# Patient Record
Sex: MALE
[De-identification: names, ages, dates, MRNs, and addresses within clinical notes are randomized per-mention and may not be internally consistent; named-entity substitution may affect disease eponyms.]

---

## 2022-12-01 ENCOUNTER — TRANSCRIBE ORDERS (OUTPATIENT)
Dept: OTHER | Age: 27
End: 2022-12-01

## 2022-12-01 DIAGNOSIS — J39.2 THROAT IRRITATION: Primary | ICD-10-CM

## 2022-12-01 NOTE — TELEPHONE ENCOUNTER
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  6/9/22, 5/20/22- note from Maddie Ochoa APRN, CNP    1/6/22- note from Tiburcio Flores M.D.   More notes in care everywhere until 11/24/17 Care everywhere  12/19/22- Pending  -PACS                             December 19, 2022 3:35 PM - Faxed a request to [facility] to push Image to [facility] P[facility]- [facility]  January 9, 2023 10:26 AM - Faxed a 2nd request to [facility] to push Image to [facility] PACS- [facility]  January 16, 2023 12:49 PM Called [place] to push images to [facility] PACS- [place]     January 16, 2023 1:40 PM - Received image in pacs and attached it to patient- [facility]

## 2023-01-24 NOTE — PROGRESS NOTES
Otolaryngology Clinic      Name: Quincy Tong  MRN: [id number]  Age: 27 year old  : 1995  Referring provider: Rick L Nissen  2023      Chief Complaint:  Consultation    History of Present Illness:   Quincy Tong is a 27 year old male with a history of GERD who presents for consultation regarding left sided throat pain and irritation. He has had ongoing symptoms which wax and wane for about 2 years. His pain is worse on the left side which can extend from his tonsils up to his right ear. When the pain radiates to his ear it feels like more of a numbness versus pain. At times when his pain is exacerbated he can reach back and itch the back of his throat which seems to help. The irritation increases with swallowing foods but can swallow liquids without difficulty. He has noticed in the summer while talking on the phone for about an hour causes voice soreness which is somewhat improved when he talks on the phone less in the winter. He likes to sing at [employer] on . He has also noticed a geographic tongue on the left side as well as weakness. He denies bleeding or redness of his tongue. He has noticed small lumps on the left side of his neck. He did try physical therapy which seemed help somewhat but he does not have time to do this consistently. He has also tried treating GERD and anxiety as possible sources of his symptoms with no improvement. He has not been taking any pain medications due to his history of GERD. He obtained laboratory studies to evaluate for possible causes as well but these were within normal limits. He has followed with ENT at [place] in the past with reportedly negative flex endoscopies. He more recently consulted with Dr. Nissen and there was concern for elongated styloid so he was referred here for further discussion of next steps. He denies history of tonsillectomy.      Active Medications:   Lexapro   Prilosec      Allergies:   Patient has no known allergies.      Past  "Medical History:  GERD  ADHD with hyperactivity disorder   Rectal bleeding      Past Surgical History:  [place] teeth extraction     Family History:   Arrhythmia    Sleep apnea  Brain tumor      Social History:        Review of Systems:   Pertinent items are noted in HPI or as in patient entered ROS below, remainder of complete ROS is negative.    ENT ROS 1/31/2023   Constitutional: Weight gain   Neurology: Dizzy spells, Numbness   Psychology: Frequently feeling anxious   Ears, Nose, Throat: Ear pain, Nasal congestion or drainage, Sore throat, Trouble swallowing   Cardiopulmonary: Cough   Gastrointestinal/Genitourinary: Heartburn/indigestion, Constipation, Diarrhea   Musculoskeletal: Back pain, Neck pain   Hematologic: Bleeding problems   Endocrine: Heat or cold intolerance         Physical Exam:   /70 (BP Location: Left arm, Patient Position: Sitting, Cuff Size: Adult Large)   Pulse 67   Ht 1.778 m (5' 10\")   Wt 87.5 kg (193 lb)   SpO2 100%   BMI 27.69 kg/m       Constitutional:  The patient was accompanied, well-groomed, and in no acute distress.    Skin:  Warm and pink.    Neurologic:  Alert and oriented x 3.  CN's III-XII within normal limits.  Voice normal.   Psychiatric:  The patient's affect was calm, cooperative, and appropriate.    Respiratory:  Breathing comfortably without stridor or exertion of accessory muscles.    Eyes: Extraocular movement intact.    Head:  Normocephalic and atraumatic.  No lesions or scars.    Nose:  Sinuses were non-tender.  Anterior rhinoscopy revealed midline septum and absence of purulence or polyps.    OC/OP:  Normal tongue, floor of mouth, buccal mucosa, and palate.  No lesions or masses on inspection or palpation.  No abnormal lymph tissue in the oropharynx.  The pterygoid region is non-tender.  No trigger points of tenderness.   Neck:  Supple with normal laryngeal and tracheal landmarks.  The parotid beds were without masses.  No palpable thyroid.  Lymphatic:  " There is no palpable lymphadenopathy in the neck.     Imaging:   CT neck soft tissue 4/13/22  IMPRESSION:   1. No soft tissue mass or lymphadenopathy. The patient's palpable area of concern corresponds to the mid left sternocleidomastoid muscle.   2. Symmetric narrowing of the oropharyngeal airway at the level of the uvula. Correlation with physical exam recommended.   3. Mild/moderate right frontal, ethmoid and sphenoid sinus disease.    US head neck soft tissue 2/15/22  IMPRESSION:   Normal exam.    Assessment and Plan:    ICD-10-CM    1. Throat irritation  J39.2 Adult ENT  Referral         Quincy Tong is a 27 year old male with a history of GERD who presents for consultation regarding left sided throat pain and irritation. I discussed potential etiology of Eagle's syndrome and will communicate with radiology in order to have the styloid process measured to further evaluate further. We had a discussion about Eagle syndrome and discussed surgical intervention including risks for stroke and persistence of symptoms. His symptoms certainly do fit a characteristic of Sitka's syndrome but we will evaluate his anatomy further in order to rule this out and will discuss other next steps pending this. I discussed options for injections if his anatomy is not consistent with Sitka's syndrome. I discussed option for salicylate and other antiinflammatory medications for 3 weeks at a time for pain. I recommend he start using Voltaren cream or asper cream for pain in the mean time and we will check in pending measurements from radiology.     Follow-up: No follow-ups on file.         Scribe Disclosure:  I, Paige Henry, am serving as a scribe to document services personally performed by Joe aBins MD at this visit, based upon the provider's statements to me. All documentation has been reviewed by the aforementioned provider prior to being entered into the official medical record.

## 2023-01-31 ENCOUNTER — OFFICE VISIT (OUTPATIENT)
Dept: OTOLARYNGOLOGY | Facility: CLINIC | Age: 28
End: 2023-01-31
Attending: OTOLARYNGOLOGY
Payer: COMMERCIAL

## 2023-01-31 ENCOUNTER — PRE VISIT (OUTPATIENT)
Dept: OTOLARYNGOLOGY | Facility: CLINIC | Age: 28
End: 2023-01-31

## 2023-01-31 VITALS
BODY MASS INDEX: 27.63 KG/M2 | HEART RATE: 67 BPM | SYSTOLIC BLOOD PRESSURE: 114 MMHG | DIASTOLIC BLOOD PRESSURE: 70 MMHG | OXYGEN SATURATION: 100 % | HEIGHT: 70 IN | WEIGHT: 193 LBS

## 2023-01-31 DIAGNOSIS — J39.2 THROAT IRRITATION: ICD-10-CM

## 2023-01-31 PROCEDURE — 99204 OFFICE O/P NEW MOD 45 MIN: CPT | Performed by: OTOLARYNGOLOGY

## 2023-01-31 ASSESSMENT — PAIN SCALES - GENERAL: PAINLEVEL: MILD PAIN (3)

## 2023-01-31 NOTE — LETTER
2023       RE: Quincy Tong  [address] [facility] [address]     Dear Colleague,    Thank you for referring your patient, Quincy Tong, to the [facility] EAR NOSE AND THROAT CLINIC [place] at [facility]. Please see a copy of my visit note below.      Otolaryngology Clinic      Name: Quincy Tong  MRN: [id number]  Age: 27 year old  : 1995  Referring provider: Rick L Nissen  2023      Chief Complaint:  Consultation    History of Present Illness:   Quincy Tong is a 27 year old male with a history of GERD who presents for consultation regarding left sided throat pain and irritation. He has had ongoing symptoms which wax and wane for about 2 years. His pain is worse on the left side which can extend from his tonsils up to his right ear. When the pain radiates to his ear it feels like more of a numbness versus pain. At times when his pain is exacerbated he can reach back and itch the back of his throat which seems to help. The irritation increases with swallowing foods but can swallow liquids without difficulty. He has noticed in the summer while talking on the phone for about an hour causes voice soreness which is somewhat improved when he talks on the phone less in the winter. He likes to sing at [employer] [place] . He has also noticed a geographic tongue on the left side as well as weakness. He denies bleeding or redness of his tongue. He has noticed small lumps on the left side of his neck. He did try physical therapy which seemed help somewhat but he does not have time to do this consistently. He has also tried treating GERD and anxiety as possible sources of his symptoms with no improvement. He has not been taking any pain medications due to his history of GERD. He obtained laboratory studies to evaluate for possible causes as well but these were within normal limits. He has followed with ENT at [place] in the past with  "reportedly negative flex endoscopies. He more recently consulted with Dr. Nissen and there was concern for elongated styloid so he was referred here for further discussion of next steps. He denies history of tonsillectomy.      Active Medications:   Lexapro   Prilosec      Allergies:   Patient has no known allergies.      Past Medical History:  GERD  ADHD with hyperactivity disorder   Rectal bleeding      Past Surgical History:  [place] teeth extraction     Family History:   Arrhythmia    Sleep apnea  Brain tumor      Social History:        Review of Systems:   Pertinent items are noted in HPI or as in patient entered ROS below, remainder of complete ROS is negative.    ENT ROS 1/31/2023   Constitutional: Weight gain   Neurology: Dizzy spells, Numbness   Psychology: Frequently feeling anxious   Ears, Nose, Throat: Ear pain, Nasal congestion or drainage, Sore throat, Trouble swallowing   Cardiopulmonary: Cough   Gastrointestinal/Genitourinary: Heartburn/indigestion, Constipation, Diarrhea   Musculoskeletal: Back pain, Neck pain   Hematologic: Bleeding problems   Endocrine: Heat or cold intolerance         Physical Exam:   /70 (BP Location: Left arm, Patient Position: Sitting, Cuff Size: Adult Large)   Pulse 67   Ht 1.778 m (5' 10\")   Wt 87.5 kg (193 lb)   SpO2 100%   BMI 27.69 kg/m       Constitutional:  The patient was accompanied, well-groomed, and in no acute distress.    Skin:  Warm and pink.    Neurologic:  Alert and oriented x 3.  CN's III-XII within normal limits.  Voice normal.   Psychiatric:  The patient's affect was calm, cooperative, and appropriate.    Respiratory:  Breathing comfortably without stridor or exertion of accessory muscles.    Eyes: Extraocular movement intact.    Head:  Normocephalic and atraumatic.  No lesions or scars.    Nose:  Sinuses were non-tender.  Anterior rhinoscopy revealed midline septum and absence of purulence or polyps.    OC/OP:  Normal tongue, floor of mouth, " buccal mucosa, and palate.  No lesions or masses on inspection or palpation.  No abnormal lymph tissue in the oropharynx.  The pterygoid region is non-tender.  No trigger points of tenderness.   Neck:  Supple with normal laryngeal and tracheal landmarks.  The parotid beds were without masses.  No palpable thyroid.  Lymphatic:  There is no palpable lymphadenopathy in the neck.     Imaging:   CT neck soft tissue 4/13/22  IMPRESSION:   1. No soft tissue mass or lymphadenopathy. The patient's palpable area of concern corresponds to the mid left sternocleidomastoid muscle.   2. Symmetric narrowing of the oropharyngeal airway at the level of the uvula. Correlation with physical exam recommended.   3. Mild/moderate right frontal, ethmoid and sphenoid sinus disease.    US head neck soft tissue 2/15/22  IMPRESSION:   Normal exam.    Assessment and Plan:    ICD-10-CM    1. Throat irritation  J39.2 Adult ENT  Referral         Quincy Tong is a 27 year old male with a history of GERD who presents for consultation regarding left sided throat pain and irritation. I discussed potential etiology of Eagle's syndrome and will communicate with radiology in order to have the styloid process measured to further evaluate further. We had a discussion about Eagle syndrome and discussed surgical intervention including risks for stroke and persistence of symptoms. His symptoms certainly do fit a characteristic of [place]'s syndrome but we will evaluate his anatomy further in order to rule this out and will discuss other next steps pending this. I discussed options for injections if his anatomy is not consistent with Bennington's syndrome. I discussed option for salicylate and other antiinflammatory medications for 3 weeks at a time for pain. I recommend he start using Voltaren cream or asper cream for pain in the mean time and we will check in pending measurements from radiology.     Follow-up: No follow-ups on file.         Scribe  Disclosure:  I, Paige Henry, am serving as a scribe to document services personally performed by Joe Bains MD at this visit, based upon the provider's statements to me. All documentation has been reviewed by the aforementioned provider prior to being entered into the official medical record.       Again, thank you for allowing me to participate in the care of your patient.      Sincerely,    Joe Bains MD

## 2023-01-31 NOTE — PATIENT INSTRUCTIONS
"You were seen in the clinic today by Dr. Bains. If you have any questions or concerns after your appointment, please call the clinic at [phone]. Press \"1\" for scheduling, press \"3\" for nurse advice.    2.   The following has been recommended for you based upon your appointment today:   - We will follow up with you via phone to discuss our findings once radiology has completed taking measurements of your imaging.   -Apply Voltaren cream or Aspercreme to the affected area to help treat your pain.  These creams can be purchased over-the-counter at [facility] pharmacy.    Yecenia BROWN, RN  [facility]  Department of Otolaryngology  [phone]      "

## 2023-02-05 ENCOUNTER — MYC MEDICAL ADVICE (OUTPATIENT)
Dept: OTOLARYNGOLOGY | Facility: CLINIC | Age: 28
End: 2023-02-05
Payer: COMMERCIAL

## 2023-02-05 DIAGNOSIS — J39.2 THROAT IRRITATION: Primary | ICD-10-CM

## 2023-02-09 RX ORDER — SALSALATE 500 MG/1
500 TABLET, FILM COATED ORAL 2 TIMES DAILY
Qty: 28 TABLET | Refills: 1 | Status: SHIPPED | OUTPATIENT
Start: 2023-02-09 | End: 2024-09-04

## 2023-02-12 ENCOUNTER — HEALTH MAINTENANCE LETTER (OUTPATIENT)
Age: 28
End: 2023-02-12

## 2023-02-14 ENCOUNTER — VIRTUAL VISIT (OUTPATIENT)
Dept: OTOLARYNGOLOGY | Facility: CLINIC | Age: 28
End: 2023-02-14
Payer: COMMERCIAL

## 2023-02-14 VITALS — HEIGHT: 70 IN | WEIGHT: 190 LBS | BODY MASS INDEX: 27.2 KG/M2

## 2023-02-14 DIAGNOSIS — M79.2 NEUROPATHIC PAIN: Primary | ICD-10-CM

## 2023-02-14 PROCEDURE — 99213 OFFICE O/P EST LOW 20 MIN: CPT | Mod: 95 | Performed by: OTOLARYNGOLOGY

## 2023-02-14 ASSESSMENT — PAIN SCALES - GENERAL: PAINLEVEL: MILD PAIN (2)

## 2023-02-14 NOTE — PROGRESS NOTES
We had a phone visit with Mr. Quincy Tong today.  He [place] in [place], [place] of the [facility].  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  We got a craniofacial CT on him and he does not have elongated styloid processes, calcified ligaments, etc. I have been evaluating him for quite some time. He was not offered tonsillectomy or anything else of this nature.  He has been to a number of otolaryngologists.  His pain is quite severe for him.  He would like to get to a root cause for this if possible.  I talked to him today about the fact that it is not Freedom syndrome, but that with this pain that he gets, it is not necessarily increasing symptoms, but has been moderately severe for him.  I am going to go ahead and get a skull base MRI on this to make sure he is not having any type of tumor along the glossopharyngeal nerve or any inflammation in the glossopharyngeal nerve.  At that point in time, he may gain some benefit from swallowing or throat rehabilitation-type clinic through the [facility].  It may be that this is going to be relegated to a complex craniofacial pain problem that would have to be seen by Dr. Carrasco or his colleagues as well.      10-15 minutes

## 2023-02-14 NOTE — LETTER
Date:March 8, 2023      Patient was self referred, no letter generated. Do not send.        [facility] Health Information

## 2023-02-14 NOTE — PATIENT INSTRUCTIONS
"You were seen in the clinic today by Dr. Bains. If you have any questions or concerns after your appointment, please call the clinic at [phone]. Press \"1\" for scheduling, press \"3\" for nurse advice.    2.   The following has been recommended for you based upon your appointment today:   -MRI skull base    3.   Plan to return the clinic in with a phone visit after MRI is completed    Yecenia BROWN, RN  [facility]  Department of Otolaryngology  [phone]     "

## 2023-02-14 NOTE — LETTER
2/14/2023       RE: Quincy Tong  [address] [facility] [address]     Dear Colleague,    Thank you for referring your patient, Quincy Tong, to the [facility] EAR NOSE AND THROAT CLINIC [place] at [facility]. Please see a copy of my visit note below.    We had a phone visit with Mr. Quincy Tong today.  He lives in [place], [place] of the [facility].  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  We got a craniofacial CT on him and he does not have elongated styloid processes, calcified ligaments, etc. I have been evaluating him for quite some time. He was not offered tonsillectomy or anything else of this nature.  He has been to a number of otolaryngologists.  His pain is quite severe for him.  He would like to get to a root cause for this if possible.  I talked to him today about the fact that it is not [place] syndrome, but that with this pain that he gets, it is not necessarily increasing symptoms, but has been moderately severe for him.  I am going to go ahead and get a skull base MRI on this to make sure he is not having any type of tumor along the glossopharyngeal nerve or any inflammation in the glossopharyngeal nerve.  At that point in time, he may gain some benefit from swallowing or throat rehabilitation-type clinic through the [facility].  It may be that this is going to be relegated to a complex craniofacial pain problem that would have to be seen by Dr. Carrasco or his colleagues as well.      10-15 minutes        Again, thank you for allowing me to participate in the care of your patient.      Sincerely,    Joe Bains MD

## 2023-02-16 ENCOUNTER — TELEPHONE (OUTPATIENT)
Dept: OTOLARYNGOLOGY | Facility: CLINIC | Age: 28
End: 2023-02-16
Payer: COMMERCIAL

## 2023-02-24 ENCOUNTER — MYC MEDICAL ADVICE (OUTPATIENT)
Dept: OTOLARYNGOLOGY | Facility: CLINIC | Age: 28
End: 2023-02-24
Payer: COMMERCIAL

## 2023-02-24 DIAGNOSIS — M79.2 NEUROPATHIC PAIN: Primary | ICD-10-CM

## 2023-02-24 NOTE — TELEPHONE ENCOUNTER
This author spoke with Yecenia Vazquez RN regarding the patient's previous [employer] message request for a neck MRI in addition to the MRI of the patient's skull base.  It was decided that an order would be issued for an MRI soft tissue neck in addition to the skull base MRI.  Author sent a [employer] message to the patient to offer the potential MR opening that would require that the patient come in at 6:30 that morning.  Patient responded that he would like to take that opportunity, and the author arranged the new MRI appointment with the imaging scheduling team and sent a confirmation message to the patient via [employer].    Gloria Lemus LPN

## 2024-01-02 ENCOUNTER — OFFICE VISIT (OUTPATIENT)
Dept: OTOLARYNGOLOGY | Facility: CLINIC | Age: 29
End: 2024-01-02
Payer: COMMERCIAL

## 2024-01-02 VITALS
SYSTOLIC BLOOD PRESSURE: 117 MMHG | HEART RATE: 78 BPM | BODY MASS INDEX: 27.04 KG/M2 | OXYGEN SATURATION: 100 % | WEIGHT: 188.9 LBS | DIASTOLIC BLOOD PRESSURE: 73 MMHG | HEIGHT: 70 IN

## 2024-01-02 DIAGNOSIS — R21 RASH: Primary | ICD-10-CM

## 2024-01-02 PROCEDURE — 99213 OFFICE O/P EST LOW 20 MIN: CPT | Performed by: OTOLARYNGOLOGY

## 2024-01-02 RX ORDER — ERYTHROMYCIN 20 MG/G
GEL TOPICAL DAILY
Qty: 30 G | Refills: 0 | Status: SHIPPED | OUTPATIENT
Start: 2024-01-02 | End: 2024-09-04

## 2024-01-02 RX ORDER — FLUTICASONE PROPIONATE 50 MCG
1 SPRAY, SUSPENSION (ML) NASAL DAILY
COMMUNITY
End: 2024-09-04

## 2024-01-02 RX ORDER — NYSTATIN 100000 U/G
OINTMENT TOPICAL 2 TIMES DAILY
Qty: 15 G | Refills: 1 | Status: SHIPPED | OUTPATIENT
Start: 2024-01-02 | End: 2024-09-04

## 2024-01-02 ASSESSMENT — PAIN SCALES - GENERAL: PAINLEVEL: MILD PAIN (3)

## 2024-01-02 NOTE — PROGRESS NOTES
"  Otolaryngology Clinic    Name: Quincy Tong  MRN: [id number]  Age: 28 year old  : 1995  2024      Chief Complaint:   Follow up     History of Present Illness:   Quincy Tong is a 28 year old male with a history of GERD who presents for follow up. Today he complaints of increased throat pain/swelling which has been waxing and waning for the past 2 years. His pain is worse on the left side which extend from his tonsils to the roof of his mouth. He has also tried treating GERD and anxiety as possible sources of his symptoms with no improvement. He recently tried methylene blue but has stopped taking the medication due to not feeling well.    He works as a .      Review of Systems:   Pertinent items are noted in HPI or as in patient entered ROS below, remainder of complete ROS is negative.       2023     7:12 AM    ENT ROS   Constitutional Weight gain   Neurology Dizzy spells    Numbness   Psychology Frequently feeling anxious   Ears, Nose, Throat Ear pain    Nasal congestion or drainage    Sore throat    Trouble swallowing   Cardiopulmonary Cough   Gastrointestinal/Genitourinary Heartburn/indigestion    Constipation    Diarrhea   Musculoskeletal Back pain    Neck pain   Hematologic Bleeding problems   Endocrine Heat or cold intolerance        Physical Exam:   /73   Pulse 78   Ht 1.778 m (5' 10\")   Wt 85.7 kg (188 lb 14.4 oz)   SpO2 100%   BMI 27.10 kg/m       PHYSICAL EXAMINATION:    Constitutional:  The patient was accompanied, well-groomed, and in no acute distress.    Skin:  Warm and pink.    Neurologic:  Alert and oriented x 3.  CN's III-XII within normal limits.  Voice normal.   Psychiatric:  The patient's affect was calm, cooperative, and appropriate.    Respiratory:  Breathing comfortably without stridor or exertion of accessory muscles.    Eyes: Extraocular movement intact.    Head:  Normocephalic and atraumatic.  No lesions or scars.    Nose:  " Sinuses were non-tender.  Anterior rhinoscopy revealed midline septum and absence of purulence or polyps.    OC/OP:  Normal tongue, floor of mouth, buccal mucosa, and palate.  No lesions or masses on inspection or palpation.  No abnormal lymph tissue in the oropharynx.  The pterygoid region is non-tender.  No trigger points of tenderness.   Neck:  Supple with normal laryngeal and tracheal landmarks.  The parotid beds were without masses.  No palpable thyroid.  Lymphatic:  There is no palpable lymphadenopathy in the neck.      Assessment and Plan:  Quincy Tong is a 28 year old male with a history of GERD who presents for follow up. He is still having local  throat pain which he will use topical gentian violet. an ointment for. He may hae an unusual fungal infection in his tonsillar area. I will follow up with him via phone in 5 weeks.       Follow-up: Return in about 5 weeks (around 2/6/2024) for using a video visit, using a phone visit.         Scribe Disclosure:   I, Sravanthi Mayfield, am serving as a scribe; to document services personally performed by Joe Bains MD -based on data collection and the provider's statements to me.     Provider Disclosure:  I agree with above History, Review of Systems, Physical exam and Plan.  I have reviewed the content of the documentation and have edited it as needed. I have personally performed the services documented here and the documentation accurately represents those services and the decisions I have made.      Electronically signed by:  Joe Bains MD

## 2024-01-02 NOTE — LETTER
"2024       RE: Quincy Tong  [address] [address]     Dear Colleague,    Thank you for referring your patient, Quincy Tong, to the [facility] EAR NOSE AND THROAT CLINIC [place] at [facility]. Please see a copy of my visit note below.      Otolaryngology Clinic    Name: Quincy Tong  MRN: [id number]  Age: 28 year old  : 1995  2024      Chief Complaint:   Follow up     History of Present Illness:   Quincy Tong is a 28 year old male with a history of GERD who presents for follow up. Today he complaints of increased throat pain/swelling which has been waxing and waning for the past 2 years. His pain is worse on the left side which extend from his tonsils to the roof of his mouth. He has also tried treating GERD and anxiety as possible sources of his symptoms with no improvement. He recently tried methylene blue but has stopped taking the medication due to not feeling well.    He works as a .      Review of Systems:   Pertinent items are noted in HPI or as in patient entered ROS below, remainder of complete ROS is negative.       2023     7:12 AM   [facility] ENT ROS   Constitutional Weight gain   Neurology Dizzy spells    Numbness   Psychology Frequently feeling anxious   Ears, Nose, Throat Ear pain    Nasal congestion or drainage    Sore throat    Trouble swallowing   Cardiopulmonary Cough   Gastrointestinal/Genitourinary Heartburn/indigestion    Constipation    Diarrhea   Musculoskeletal Back pain    Neck pain   Hematologic Bleeding problems   Endocrine Heat or cold intolerance        Physical Exam:   /73   Pulse 78   Ht 1.778 m (5' 10\")   Wt 85.7 kg (188 lb 14.4 oz)   SpO2 100%   BMI 27.10 kg/m       PHYSICAL EXAMINATION:    Constitutional:  The patient was accompanied, well-groomed, and in no acute distress.    Skin:  Warm and pink.    Neurologic:  Alert and oriented x 3.  CN's " III-XII within normal limits.  Voice normal.   Psychiatric:  The patient's affect was calm, cooperative, and appropriate.    Respiratory:  Breathing comfortably without stridor or exertion of accessory muscles.    Eyes: Extraocular movement intact.    Head:  Normocephalic and atraumatic.  No lesions or scars.    Nose:  Sinuses were non-tender.  Anterior rhinoscopy revealed midline septum and absence of purulence or polyps.    OC/OP:  Normal tongue, floor of mouth, buccal mucosa, and palate.  No lesions or masses on inspection or palpation.  No abnormal lymph tissue in the oropharynx.  The pterygoid region is non-tender.  No trigger points of tenderness.   Neck:  Supple with normal laryngeal and tracheal landmarks.  The parotid beds were without masses.  No palpable thyroid.  Lymphatic:  There is no palpable lymphadenopathy in the neck.      Assessment and Plan:  Quincy Tong is a 28 year old male with a history of GERD who presents for follow up. He is still having local  throat pain which he will use topical gentian violet. an ointment for. He may hae an unusual fungal infection in his tonsillar area. I will follow up with him via phone in 5 weeks.       Follow-up: Return in about 5 weeks (around 2/6/2024) for using a video visit, using a phone visit.         Scribe Disclosure:   I, Sravanthi Mayfield, am serving as a scribe; to document services personally performed by Joe Bains MD -based on data collection and the provider's statements to me.     Provider Disclosure:  I agree with above History, Review of Systems, Physical exam and Plan.  I have reviewed the content of the documentation and have edited it as needed. I have personally performed the services documented here and the documentation accurately represents those services and the decisions I have made.      Electronically signed by:  Joe Bains MD            Again, thank you for allowing me to participate in the care of your patient.       Sincerely,    Joe Bains MD

## 2024-01-02 NOTE — PATIENT INSTRUCTIONS
Patient may have an unusual fungal infection like histoplasmosis in his tonsillar area. 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 Hopefully within 24 hours. He  lives in [place]./     This should be sent in 2 specimens. One for fungal stain and one for pathology/     A staff or a PA can do this.

## 2024-02-06 ENCOUNTER — APPOINTMENT (OUTPATIENT)
Dept: OTOLARYNGOLOGY | Facility: CLINIC | Age: 29
End: 2024-02-06
Payer: COMMERCIAL

## 2024-03-10 ENCOUNTER — HEALTH MAINTENANCE LETTER (OUTPATIENT)
Age: 29
End: 2024-03-10

## 2024-04-08 ENCOUNTER — TELEPHONE (OUTPATIENT)
Dept: OTOLARYNGOLOGY | Facility: CLINIC | Age: 29
End: 2024-04-08
Payer: COMMERCIAL

## 2024-04-08 NOTE — TELEPHONE ENCOUNTER
[facility] Call Center    Phone Message    May a detailed message be left on voicemail: yes     Reason for Call: Other: Per pt he is stating that he is having a flare up and needs to be seen today. Please call to discuss. Thank you     Action Taken: Message routed to:  [facility] ([facility] ENT    Travel Screening: Not Applicable

## 2024-04-09 NOTE — TELEPHONE ENCOUNTER
"Returned call to patient who indicates that his symptoms of \"red spots\" on his tonsil as well as some pain have resolved. He feels like he noticed these symptoms for a few days and no longer indicates any concern.     Patient offered phone visit to follow-up from previous appointment with Dr. Bains. Patient scheduled on 4/16 at 11:45am.     He was encouraged to call with further questions or concerns.       Irma Trivedi RN, BSN    "

## 2024-04-16 ENCOUNTER — TELEPHONE (OUTPATIENT)
Dept: OTOLARYNGOLOGY | Facility: CLINIC | Age: 29
End: 2024-04-16

## 2024-04-16 ENCOUNTER — VIRTUAL VISIT (OUTPATIENT)
Dept: OTOLARYNGOLOGY | Facility: CLINIC | Age: 29
End: 2024-04-16
Payer: COMMERCIAL

## 2024-04-16 DIAGNOSIS — M79.2 NEUROPATHIC PAIN: Primary | ICD-10-CM

## 2024-04-16 PROCEDURE — 99212 OFFICE O/P EST SF 10 MIN: CPT | Mod: 93 | Performed by: OTOLARYNGOLOGY

## 2024-04-16 NOTE — PROGRESS NOTES
We spoke with the patient today and he seems to have some kind of fissuring of his tongue that might be new.  He states that he will get a white sort of small lesion that may also be on his tongue and an area.  He says that he will continue to have this area of pain along his left anterior tonsillar pillar as well.  Of course this was determined to a phone call visit today and I think at the present time today we will need to get him in for a visit and physical visit to some sort of biopsy of his tonsil with either a fine-needle aspiration cytology or with a cup forceps of some kind.  We are on the phone with him today from 12:00 until 12:05 PM.  With extra 5 minutes of chart preparation time.

## 2024-04-16 NOTE — LETTER
4/16/2024       RE: Quincy Tong  [address] [address]     Dear Colleague,    Thank you for referring your patient, Quincy Tong, to the [facility] EAR NOSE AND THROAT CLINIC [place] at [facility]. Please see a copy of my visit note below.    We spoke with the patient today and he seems to have some kind of fissuring of his tongue that might be new.  He states that he will get a white sort of small lesion that may also be on his tongue and an area.  He says that he will continue to have this area of pain along his left anterior tonsillar pillar as well.  Of course this was determined to a phone call visit today and I think at the present time today we will need to get him in for a visit and physical visit to some sort of biopsy of his tonsil with either a fine-needle aspiration cytology or with a cup forceps of some kind.  We are on the phone with him today from 12:00 until 12:05 PM.  With extra 5 minutes of chart preparation time.      Again, thank you for allowing me to participate in the care of your patient.      Sincerely,    Joe Bains MD

## 2024-05-07 ENCOUNTER — OFFICE VISIT (OUTPATIENT)
Dept: OTOLARYNGOLOGY | Facility: CLINIC | Age: 29
End: 2024-05-07
Payer: COMMERCIAL

## 2024-05-07 VITALS — BODY MASS INDEX: 26.63 KG/M2 | HEIGHT: 70 IN | WEIGHT: 186 LBS

## 2024-05-07 DIAGNOSIS — J35.9 LESION OF TONSIL: Primary | ICD-10-CM

## 2024-05-07 PROCEDURE — 99213 OFFICE O/P EST LOW 20 MIN: CPT | Mod: 25 | Performed by: OTOLARYNGOLOGY

## 2024-05-07 PROCEDURE — 42800 BIOPSY OF THROAT: CPT | Performed by: OTOLARYNGOLOGY

## 2024-05-07 PROCEDURE — 88305 TISSUE EXAM BY PATHOLOGIST: CPT | Mod: TC | Performed by: OTOLARYNGOLOGY

## 2024-05-07 PROCEDURE — 88305 TISSUE EXAM BY PATHOLOGIST: CPT | Mod: 26 | Performed by: STUDENT IN AN ORGANIZED HEALTH CARE EDUCATION/TRAINING PROGRAM

## 2024-05-07 ASSESSMENT — PAIN SCALES - GENERAL: PAINLEVEL: SEVERE PAIN (6)

## 2024-05-07 NOTE — PATIENT INSTRUCTIONS
"You were seen in the clinic today by Dr. Bains. If you have any questions or concerns after your appointment, please call the clinic at [phone]. Press \"1\" for scheduling, press \"3\" for nurse advice.    2.   The following has been recommended for you based upon your appointment today:   -we will call with results from tonsil biopsy       Yecenia BROWN, RN  [facility]  Department of Otolaryngology  [phone]     "

## 2024-05-07 NOTE — LETTER
"2024       RE: Quincy Tong  [address] [address]     Dear Colleague,    Thank you for referring your patient, Quincy Tong, to the [facility] EAR NOSE AND THROAT CLINIC [place] at [facility]. Please see a copy of my visit note below.      Otolaryngology Clinic    Name: Quincy Tong  MRN: [id number]  Age: 28 year old  : 1995  2024      Chief Complaint:   Follow up     History of Present Illness:   Quincy Tong is a 28 year old male with a history of GERD who presents for follow up. He has been dealing with increased throat pain/swelling which has been waxing and waning for the past 2 years. Today he reports having little white spots in his tonsils. He feels that his tonsils may be infected. Has stopped taking methylene blue as the medication did not make him feel well.     He works as a .     Review of Systems:   Pertinent items are noted in HPI or as in patient entered ROS below, remainder of complete ROS is negative.       2024     9:58 AM    ENT ROS   Neurology Dizzy spells   Ears, Nose, Throat Ear pain   Cardiopulmonary Cough   Endocrine Heat or cold intolerance        Physical Exam:   Ht 1.778 m (5' 10\")   Wt 84.4 kg (186 lb)   BMI 26.69 kg/m       PHYSICAL EXAMINATION:    Constitutional:  The patient was accompanied, well-groomed, and in no acute distress.    Skin:  Warm and pink.    Neurologic:  Alert and oriented x 3.  CN's III-XII within normal limits.  Voice normal.   Psychiatric:  The patient's affect was calm, cooperative, and appropriate.    Respiratory:  Breathing comfortably without stridor or exertion of accessory muscles.    Eyes: Extraocular movement intact.    Head:  Normocephalic and atraumatic.  No lesions or scars.    Nose:  Sinuses were non-tender.  Anterior rhinoscopy revealed midline septum and absence of purulence or polyps.    OC/OP:  Normal tongue, floor of " mouth, buccal mucosa, and palate.  No lesions or masses on inspection or palpation. No abnormal lymph tissue in the oropharynx.  The pterygoid region is non-tender.  No trigger points of tenderness.   Neck:  Supple with normal laryngeal and tracheal landmarks.  The parotid beds were without masses.  No palpable thyroid.  Lymphatic:  There is no palpable lymphadenopathy in the neck.      Assessment and Plan:  No diagnosis found.  Quincy Tong is a 28 year old male with a history of GERD who presents for follow up. He states some little white spots in his tonsils. In clinic today, we tried to biopsy all of those areas.     Follow-up: No follow-ups on file.        Scribe Disclosure:   I, Sravanthi Mayfield, am serving as a scribe; to document services personally performed by Joe Bains MD -based on data collection and the provider's statements to me.     Provider Disclosure:  I agree with above History, Review of Systems, Physical exam and Plan.  I have reviewed the content of the documentation and have edited it as needed. I have personally performed the services documented here and the documentation accurately represents those services and the decisions I have made.      Electronically signed by:          Again, thank you for allowing me to participate in the care of your patient.      Sincerely,    Joe Bains MD

## 2024-05-07 NOTE — PROGRESS NOTES
"  Otolaryngology Clinic    Name: Qiuncy Tong  MRN: [id number]  Age: 28 year old  : 1995  2024      Chief Complaint:   Follow up     History of Present Illness:   Quincy Tong is a 28 year old male with a history of GERD who presents for follow up. He has been dealing with increased throat pain/swelling which has been waxing and waning for the past 2 years. Today he reports having little white spots in his tonsils. He feels that his tonsils may be infected. Has stopped taking methylene blue as the medication did not make him feel well.     He works as a .     Review of Systems:   Pertinent items are noted in HPI or as in patient entered ROS below, remainder of complete ROS is negative.       2024     9:58 AM    ENT ROS   Neurology Dizzy spells   Ears, Nose, Throat Ear pain   Cardiopulmonary Cough   Endocrine Heat or cold intolerance        Physical Exam:   Ht 1.778 m (5' 10\")   Wt 84.4 kg (186 lb)   BMI 26.69 kg/m       PHYSICAL EXAMINATION:    Constitutional:  The patient was accompanied, well-groomed, and in no acute distress.    Skin:  Warm and pink.    Neurologic:  Alert and oriented x 3.  CN's III-XII within normal limits.  Voice normal.   Psychiatric:  The patient's affect was calm, cooperative, and appropriate.    Respiratory:  Breathing comfortably without stridor or exertion of accessory muscles.    Eyes: Extraocular movement intact.    Head:  Normocephalic and atraumatic.  No lesions or scars.    Nose:  Sinuses were non-tender.  Anterior rhinoscopy revealed midline septum and absence of purulence or polyps.    OC/OP:  Normal tongue, floor of mouth, buccal mucosa, and palate.  No lesions or masses on inspection or palpation. No abnormal lymph tissue in the oropharynx.  The pterygoid region is non-tender.  No trigger points of tenderness.   Neck:  Supple with normal laryngeal and tracheal landmarks.  The parotid beds were without masses.  No palpable " thyroid.  Lymphatic:  There is no palpable lymphadenopathy in the neck.      Assessment and Plan:  No diagnosis found.  Quincy Tong is a 28 year old male with a history of GERD who presents for follow up. He states some little white spots in his tonsils. In clinic today, we tried to biopsy all of those areas.     Follow-up: No follow-ups on file.        Scribe Disclosure:   I, Sravanthi Mayfield, am serving as a scribe; to document services personally performed by Joe Bains MD -based on data collection and the provider's statements to me.     Provider Disclosure:  I agree with above History, Review of Systems, Physical exam and Plan.  I have reviewed the content of the documentation and have edited it as needed. I have personally performed the services documented here and the documentation accurately represents those services and the decisions I have made.      Electronically signed by:

## 2024-05-08 LAB
PATH REPORT.COMMENTS IMP SPEC: NORMAL
PATH REPORT.COMMENTS IMP SPEC: NORMAL
PATH REPORT.FINAL DX SPEC: NORMAL
PATH REPORT.GROSS SPEC: NORMAL
PATH REPORT.MICROSCOPIC SPEC OTHER STN: NORMAL
PATH REPORT.RELEVANT HX SPEC: NORMAL
PHOTO IMAGE: NORMAL

## 2024-05-15 ENCOUNTER — MYC MEDICAL ADVICE (OUTPATIENT)
Dept: OTOLARYNGOLOGY | Facility: CLINIC | Age: 29
End: 2024-05-15
Payer: COMMERCIAL

## 2024-05-15 DIAGNOSIS — J35.9 LESION OF TONSIL: Primary | ICD-10-CM

## 2024-05-15 RX ORDER — PENICILLIN V POTASSIUM 500 MG/1
500 TABLET, FILM COATED ORAL 2 TIMES DAILY
Qty: 20 TABLET | Refills: 0 | Status: SHIPPED | OUTPATIENT
Start: 2024-05-15 | End: 2024-05-28

## 2024-05-28 ENCOUNTER — MYC MEDICAL ADVICE (OUTPATIENT)
Dept: OTOLARYNGOLOGY | Facility: CLINIC | Age: 29
End: 2024-05-28
Payer: COMMERCIAL

## 2024-05-28 DIAGNOSIS — J35.9 LESION OF TONSIL: ICD-10-CM

## 2024-05-28 RX ORDER — PENICILLIN V POTASSIUM 500 MG/1
500 TABLET, FILM COATED ORAL 2 TIMES DAILY
Qty: 20 TABLET | Refills: 0 | Status: SHIPPED | OUTPATIENT
Start: 2024-05-28 | End: 2024-09-04

## 2024-06-12 ENCOUNTER — MYC MEDICAL ADVICE (OUTPATIENT)
Dept: OTOLARYNGOLOGY | Facility: CLINIC | Age: 29
End: 2024-06-12
Payer: COMMERCIAL

## 2024-06-12 DIAGNOSIS — J35.9 LESION OF TONSIL: Primary | ICD-10-CM

## 2024-06-20 RX ORDER — DOXYCYCLINE 100 MG/1
100 CAPSULE ORAL 2 TIMES DAILY
Qty: 28 CAPSULE | Refills: 0 | Status: SHIPPED | OUTPATIENT
Start: 2024-06-20 | End: 2024-09-04

## 2024-07-06 ENCOUNTER — MYC MEDICAL ADVICE (OUTPATIENT)
Dept: OTOLARYNGOLOGY | Facility: CLINIC | Age: 29
End: 2024-07-06
Payer: COMMERCIAL

## 2024-07-08 NOTE — TELEPHONE ENCOUNTER
This writer spoke with patient regarding scheduling follow up with Dr. Bains. Patient notes that he is doing better but things aren't completely gone. Patient stresses difficulty of appointments given his job. Advised patient that it would be okay to reschedule the appointment to later. Patient asked for a Monday first thing appointment of a Friday afternoon appointment. Patient was advised that unfortunately Dr. Bains is only in clinic on Tuesdays. Offered patient an early morning patient with Dr. Bains. Patient rescheduled for August 6th at 7:15 am. Patient advised to reach back out for any questions or concerns prior to that appointment or if the appointment time doesn't end up working with his job.    ALLEN COLES, OLGA LIDIA on 7/8/2024 at 12:20 PM

## 2024-08-06 ENCOUNTER — OFFICE VISIT (OUTPATIENT)
Dept: OTOLARYNGOLOGY | Facility: CLINIC | Age: 29
End: 2024-08-06
Payer: COMMERCIAL

## 2024-08-06 VITALS
DIASTOLIC BLOOD PRESSURE: 83 MMHG | SYSTOLIC BLOOD PRESSURE: 120 MMHG | HEART RATE: 82 BPM | WEIGHT: 191.9 LBS | BODY MASS INDEX: 27.47 KG/M2 | OXYGEN SATURATION: 99 % | HEIGHT: 70 IN

## 2024-08-06 DIAGNOSIS — A42.9 ACTINOMYCOSIS: Primary | ICD-10-CM

## 2024-08-06 PROCEDURE — 99213 OFFICE O/P EST LOW 20 MIN: CPT | Performed by: OTOLARYNGOLOGY

## 2024-08-06 ASSESSMENT — PAIN SCALES - GENERAL: PAINLEVEL: NO PAIN (1)

## 2024-08-06 NOTE — PROGRESS NOTES
The patient is here for follow-up today.  The chronic throat pain that he has been having is attributable to both exacerbation of reflux or something similar in the evenings with soda as well as when he has reflux symptoms that are not remediated by Prevacid or something similar to this otherwise he seems to be doing reasonably well he was recommended to have a GI appointment this was not given we did do a biopsy of his throat for some lesions that may have been there and these were actually colonies of actinomycosis.    On physical examination today the patient is alert and oriented drain present skin face of the neck and him is otherwise normal no adenopathy in his neck is HIS oropharynx is clear's ears are examined they are clear throughout.  Assessment    Patient with a history of some chronic pharyngitis is probably attributable to both GI reflux as well as intermittent problems perhaps with overgrowth of actinomycosis we have sent a new ID consult with a diagnosis factor mycosis and tissues as well as a GI consult hopefully to [facility] for him to see someone closer to home and we did see him again perhaps in about 6 months or so by phone to make sure his own appointments have occurred I do not think that doing a tonsillectomy anything else of this nature is going to automatically take care of all of his problems.

## 2024-08-06 NOTE — LETTER
8/6/2024       RE: Quincy Tong  [address] [facility] [address]     Dear Colleague,    Thank you for referring your patient, Quincy Tong, to the [facility] EAR NOSE AND THROAT CLINIC [place] at [facility]. Please see a copy of my visit note below.    The patient is here for follow-up today.  The chronic throat pain that he has been having is attributable to both exacerbation of reflux or something similar in the evenings with soda as well as when he has reflux symptoms that are not remediated by Prevacid or something similar to this otherwise he seems to be doing reasonably well he was recommended to have a GI appointment this was not given we did do a biopsy of his throat for some lesions that may have been there and these were actually colonies of actinomycosis.    On physical examination today the patient is alert and oriented drain present skin face of the neck and him is otherwise normal no adenopathy in his neck is HIS oropharynx is clear's ears are examined they are clear throughout.  Assessment    Patient with a history of some chronic pharyngitis is probably attributable to both GI reflux as well as intermittent problems perhaps with overgrowth of actinomycosis we have sent a new ID consult with a diagnosis factor mycosis and tissues as well as a GI consult hopefully to [facility] for him to see someone closer to home and we did see him again perhaps in about 6 months or so by phone to make sure his own appointments have occurred I do not think that doing a tonsillectomy anything else of this nature is going to automatically take care of all of his problems.      Again, thank you for allowing me to participate in the care of your patient.      Sincerely,    Joe Bains MD

## 2024-08-06 NOTE — PATIENT INSTRUCTIONS
You were seen in the ENT Clinic today by Dr. Bains. If you have any questions or concerns after your appointment, please contact us (see below)       2.   The following recommendations have been made based upon your appointment today:   -Referrals have been placed. Let us know if you have any issues getting appointments scheduled.      3.   Plan to return to the ENT clinic in 6 months.           How to Contact Us:  Send a [employer] message to your provider. Our team will respond to you via [employer]. Occasionally, we will need to call you to get further information.  For urgent matters (Monday-Friday), call the ENT Clinic: [phone] and speak with a call center team member - they will route your call appropriately.   If you'd like to speak directly with a nurse, please find our contact information below. We do our best to check voicemail frequently throughout the day, and will work to call you back within 1-2 days. For urgent matters, please use the general clinic phone numbers listed above.     LINDY Keene  Direct: [phone]  Maddie MOORE LPN  Direct: [phone]         [facility]  Department of Otolaryngology

## 2024-08-14 NOTE — CONFIDENTIAL NOTE
DIAGNOSIS: Actinomycosis    DATE RECEIVED:  09.04.2024    NOTES (Gather within 2 years) STATUS DETAILS   OFFICE NOTE from referring provider   Internal 08.06.2024 Joe Bains MD    LABS (any labs) Internal    MEDICATION LIST Internal

## 2024-09-04 ENCOUNTER — LAB (OUTPATIENT)
Dept: LAB | Facility: CLINIC | Age: 29
End: 2024-09-04
Payer: COMMERCIAL

## 2024-09-04 ENCOUNTER — TELEPHONE (OUTPATIENT)
Dept: INFECTIOUS DISEASES | Facility: CLINIC | Age: 29
End: 2024-09-04

## 2024-09-04 ENCOUNTER — PRE VISIT (OUTPATIENT)
Dept: INFECTIOUS DISEASES | Facility: CLINIC | Age: 29
End: 2024-09-04
Payer: COMMERCIAL

## 2024-09-04 ENCOUNTER — OFFICE VISIT (OUTPATIENT)
Dept: INFECTIOUS DISEASES | Facility: CLINIC | Age: 29
End: 2024-09-04
Attending: OTOLARYNGOLOGY
Payer: COMMERCIAL

## 2024-09-04 VITALS
SYSTOLIC BLOOD PRESSURE: 126 MMHG | WEIGHT: 196 LBS | OXYGEN SATURATION: 98 % | HEART RATE: 89 BPM | HEIGHT: 70 IN | BODY MASS INDEX: 28.06 KG/M2 | DIASTOLIC BLOOD PRESSURE: 86 MMHG

## 2024-09-04 DIAGNOSIS — A42.9 ACTINOMYCOSIS: ICD-10-CM

## 2024-09-04 LAB
ALBUMIN SERPL BCG-MCNC: 4.4 G/DL (ref 3.5–5.2)
ALP SERPL-CCNC: 115 U/L (ref 40–150)
ALT SERPL W P-5'-P-CCNC: 34 U/L (ref 0–70)
ANION GAP SERPL CALCULATED.3IONS-SCNC: 10 MMOL/L (ref 7–15)
AST SERPL W P-5'-P-CCNC: 24 U/L (ref 0–45)
BASOPHILS # BLD AUTO: 0 10E3/UL (ref 0–0.2)
BASOPHILS NFR BLD AUTO: 1 %
BILIRUB SERPL-MCNC: 0.3 MG/DL
BUN SERPL-MCNC: 16.8 MG/DL (ref 6–20)
CALCIUM SERPL-MCNC: 9.6 MG/DL (ref 8.8–10.4)
CHLORIDE SERPL-SCNC: 108 MMOL/L (ref 98–107)
CREAT SERPL-MCNC: 1 MG/DL (ref 0.67–1.17)
EGFRCR SERPLBLD CKD-EPI 2021: >90 ML/MIN/1.73M2
EOSINOPHIL # BLD AUTO: 0.1 10E3/UL (ref 0–0.7)
EOSINOPHIL NFR BLD AUTO: 2 %
ERYTHROCYTE [DISTWIDTH] IN BLOOD BY AUTOMATED COUNT: 12.1 % (ref 10–15)
GLUCOSE SERPL-MCNC: 104 MG/DL (ref 70–99)
HCO3 SERPL-SCNC: 25 MMOL/L (ref 22–29)
HCT VFR BLD AUTO: 43.9 % (ref 40–53)
HGB BLD-MCNC: 14.5 G/DL (ref 13.3–17.7)
IMM GRANULOCYTES # BLD: 0 10E3/UL
IMM GRANULOCYTES NFR BLD: 0 %
LYMPHOCYTES # BLD AUTO: 1.4 10E3/UL (ref 0.8–5.3)
LYMPHOCYTES NFR BLD AUTO: 28 %
MCH RBC QN AUTO: 30.8 PG (ref 26.5–33)
MCHC RBC AUTO-ENTMCNC: 33 G/DL (ref 31.5–36.5)
MCV RBC AUTO: 93 FL (ref 78–100)
MONOCYTES # BLD AUTO: 0.6 10E3/UL (ref 0–1.3)
MONOCYTES NFR BLD AUTO: 12 %
NEUTROPHILS # BLD AUTO: 3 10E3/UL (ref 1.6–8.3)
NEUTROPHILS NFR BLD AUTO: 57 %
NRBC # BLD AUTO: 0 10E3/UL
NRBC BLD AUTO-RTO: 0 /100
PLATELET # BLD AUTO: 274 10E3/UL (ref 150–450)
POTASSIUM SERPL-SCNC: 4.1 MMOL/L (ref 3.4–5.3)
PROT SERPL-MCNC: 7.7 G/DL (ref 6.4–8.3)
RBC # BLD AUTO: 4.71 10E6/UL (ref 4.4–5.9)
SODIUM SERPL-SCNC: 143 MMOL/L (ref 135–145)
WBC # BLD AUTO: 5.2 10E3/UL (ref 4–11)

## 2024-09-04 PROCEDURE — 80053 COMPREHEN METABOLIC PANEL: CPT | Performed by: PATHOLOGY

## 2024-09-04 PROCEDURE — 36415 COLL VENOUS BLD VENIPUNCTURE: CPT | Performed by: PATHOLOGY

## 2024-09-04 PROCEDURE — 99213 OFFICE O/P EST LOW 20 MIN: CPT | Performed by: INTERNAL MEDICINE

## 2024-09-04 PROCEDURE — 99204 OFFICE O/P NEW MOD 45 MIN: CPT | Performed by: INTERNAL MEDICINE

## 2024-09-04 PROCEDURE — 85025 COMPLETE CBC W/AUTO DIFF WBC: CPT | Performed by: PATHOLOGY

## 2024-09-04 RX ORDER — PENICILLIN V POTASSIUM 500 MG/1
500 TABLET, FILM COATED ORAL 4 TIMES DAILY
Qty: 120 TABLET | Refills: 5 | Status: SHIPPED | OUTPATIENT
Start: 2024-09-04

## 2024-09-04 ASSESSMENT — PAIN SCALES - GENERAL: PAINLEVEL: MILD PAIN (2)

## 2024-09-04 NOTE — LETTER
9/4/2024       RE: Quincy Tong  [address] [address]     Dear Colleague,    Thank you for referring your patient, Quincy Tong, to the [facility] [place] [facility]. Please see a copy of my visit note below.      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   Can not confirm if intermittent recurrent lesions behind the left ear and on the left forehead are actinomycosis or not, but these seem to be less likely locations for actinomyces infection.       RECOMMENDATION:  Start oral penicillin V 500 mg PO QID.   Plan a 6 month course.  Check CBC and CMP today  RTC 4 months or sooner prn.     Total time in patient care today = 57 minutes.     Elin Cortes MD        ________________________________________________________________    Consult Question: Referred for treatment for actinomycosis found on throat lesion biopsy.            History of Present Illness:   Quincy Tong is a 28 year old male who presents with with a diagnosis actinomycosis.  He is referred from his ear nose and throat specialist Dr. Bains who did a biopsy of his throat and the pathology showed bacteria consistent with actinomyces. No culture was sent. The patient states that he thinks he has had this infection in his throat for 3 to 4 years.  He takes antibiotics and it gets better for a month.  Then it bubbles up again sometimes behind his left ear sometimes on his left forehead and sometimes on his left tonsil he gets a little breath docs for a few days.  He has seen 3 ear nose and throat  doctors and has been seen by another outside infectious disease doctor and he has taken approximately 6 courses of oral antibiotics over the last few years, but only for 10 to 14 days each time. Most recently he took a course of doxycycline and penicillin he said that has helped but he does not think it has gotten rid of it entirely but he only took the antibiotics for about 10 days.  Sometimes he hears a bubbling noise behind his left ear for 7 to 8 years.  Also he states a few years ago he had a cellulitis in his left hand for which he took IV antibiotics for a few days the cellulitis has resolved and not recurred. Most recently he saw the ear nose and throat doctor here at the [facility] and he said the doctor went in with the pliers and pulled out a sample of tissue from his throat and sent that to a lab that is what showed the ACT team no listheses.  He finished his course of antibiotics about 1 month ago.             Review of Systems:   CONSTITUTIONAL:  No fevers or chills  EYES: negative for icterus  ENT:  negative for hearing loss, tinnitus and sore throat  RESPIRATORY:  negative for cough with sputum and dyspnea  CARDIOVASCULAR:  negative for chest pain, dyspnea  GASTROINTESTINAL:  negative for nausea, vomiting, diarrhea and constipation  GENITOURINARY:  negative for dysuria  HEME:  No easy bruising  INTEGUMENT:  negative for rash and pruritus  NEURO:  Negative for headache           Past Medical History:   No past medical history on file.  From [facility] EMR-   Gastroesophageal Reflux Disease NOS       Bleeding Rectal 01/2019     Attention Deficit With Hyperactivity Disorder 8/23/2012 ADHD          Past Surgical History:   No past surgical history on file.           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          Family History:   No family history on file.  From outside [facility] Family History  Arrhythmia... Father       Sleep apnea Father       Brain Tumor Sister         Family History  Relation Name Status Comments   Father   Alive     Mother   Alive     Sister   Alive            Current Medications (antimicrobials listed in bold):     Current Outpatient Medications:      penicillin V (VEETID) 500 MG tablet, Take 1 tablet (500 mg) by mouth 4 times daily., Disp: 120 tablet, Rfl: 5           Allergies:   No Known Allergies         Physical Exam:   Vitals were reviewed    Ranges for his vital signs:  Pulse:  [89] 89  BP: (126)/(86) 126/86  SpO2:  [98 %] 98 %    Physical Examination:  GENERAL:  well-developed, well-nourished, in bed in no acute distress.   HEENT:  Head is normocephalic, atraumatic   EYES:  Eyes have anicteric sclerae without conjunctival injection or stigmata of endocarditis.    ENT:  Oropharynx is moist without exudates or ulcers. Tongue is midline  NECK:  Supple. No  Cervical lymphadenopathy  LUNGS:  Clear to auscultation bilateral.   CARDIOVASCULAR:  Regular rate and rhythm with no murmurs, gallops or rubs.  ABDOMEN:  Normal bowel sounds, soft, nontender. No appreciable hepatosplenomegaly  SKIN:  No acute rashes.    NEUROLOGIC:  Grossly nonfocal. Active x4 extremities  EXTREMITIES: No edema.          Laboratory Data:     Inflammatory Markers  No lab results found.    Hematology Studies    Recent Labs   Lab Test 09/04/24  0942   WBC 5.2   HGB 14.5   MCV 93          Metabolic Studies     Recent Labs   Lab Test 09/04/24  0942      POTASSIUM 4.1   CHLORIDE 108*   CO2 25   BUN 16.8   CR 1.00   GFRESTIMATED >90       Hepatic Studies    Recent Labs   Lab Test 09/04/24  0942   BILITOTAL 0.3   ALKPHOS 115   ALBUMIN 4.4   AST 24   ALT 34     Surgical Pathology Exam: [id number]  Order: [id number]  Collected 5/7/2024  11:47 AM       Status: Final result       Visible to patient: Yes (seen)       Dx: Lesion of tonsil    0 Result Notes       Component  Resulting Agency   Case Report   Surgical Pathology Report                         Case: [id number]                                   Authorizing Provider:  Joe Bains MD   Collected:           05/07/2024 11:47 AM           Ordering Location:     [facility] Ear Nose Received:            05/07/2024 12:07 PM                                  and Throat Clinic                                                                                    [place]                                                                   Pathologist:           Chana Talavera MD                                                                           Specimen:    Tonsil, Left, Tonsil biopsy                                                                Final Diagnosis   A. TONSIL, LEFT, BIOPSY:  - Fragments of tonsil with acute and chronic inflammation  - Colonies of filamentous organisms morphologically consistent with Actinomyces species  - No evidence of high grade dysplasia/carcinoma   Electronically signed by Chana Talavera MD on 5/8/2024 at  9:18 AM          Again, thank you for allowing me to participate in the care of your patient.      Sincerely,    Elin Cortes MD

## 2024-09-04 NOTE — NURSING NOTE
"Chief Complaint   Patient presents with    Consult     New ID    /86 (BP Location: Right arm, Cuff Size: Adult Regular)   Pulse 89   Ht 1.778 m (5' 10\")   Wt 88.9 kg (196 lb)   SpO2 98%   BMI 28.12 kg/m    Ramila Koo, MARYANNE on 9/4/2024 at 8:38 AM    "

## 2024-09-04 NOTE — PROGRESS NOTES
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   Can not confirm if intermittent recurrent lesions behind the left ear and on the left forehead are actinomycosis or not, but these seem to be less likely locations for actinomyces infection.       RECOMMENDATION:  Start oral penicillin V 500 mg PO QID.   Plan a 6 month course.  Check CBC and CMP today  RTC 4 months or sooner prn.     Total time in patient care today = 57 minutes.     Elin Cortes MD        ________________________________________________________________    Consult Question: Referred for treatment for actinomycosis found on throat lesion biopsy.            History of Present Illness:   Quincy Tong is a 28 year old male who presents with with a diagnosis actinomycosis.  He is referred from his ear nose and throat specialist Dr. Bains who did a biopsy of his throat and the pathology showed bacteria consistent with actinomyces. No culture was sent. The patient states that he thinks he has had this infection in his throat for 3 to 4 years.  He takes antibiotics and it gets better for a month.  Then it bubbles up again sometimes behind his left ear sometimes on his left forehead and sometimes on his left tonsil he gets a little breath docs for a few days.  He has seen 3 ear nose and throat doctors and has been seen by another outside infectious disease doctor and he has taken approximately 6 courses of oral antibiotics over the last few years, but only for 10 to 14 days each time. Most recently he took a course of doxycycline and penicillin he said that has helped but he does not think it has gotten rid of  it entirely but he only took the antibiotics for about 10 days.  Sometimes he hears a bubbling noise behind his left ear for 7 to 8 years.  Also he states a few years ago he had a cellulitis in his left hand for which he took IV antibiotics for a few days the cellulitis has resolved and not recurred. Most recently he saw the ear nose and throat doctor here at the [facility] and he said the doctor went in with the pliers and pulled out a sample of tissue from his throat and sent that to a lab that is what showed the ACT team no listheses.  He finished his course of antibiotics about 1 month ago.             Review of Systems:   CONSTITUTIONAL:  No fevers or chills  EYES: negative for icterus  ENT:  negative for hearing loss, tinnitus and sore throat  RESPIRATORY:  negative for cough with sputum and dyspnea  CARDIOVASCULAR:  negative for chest pain, dyspnea  GASTROINTESTINAL:  negative for nausea, vomiting, diarrhea and constipation  GENITOURINARY:  negative for dysuria  HEME:  No easy bruising  INTEGUMENT:  negative for rash and pruritus  NEURO:  Negative for headache           Past Medical History:   No past medical history on file.  From [facility] EMR-   Gastroesophageal Reflux Disease NOS       Bleeding Rectal 01/2019     Attention Deficit With Hyperactivity Disorder 8/23/2012 ADHD          Past Surgical History:   No past surgical history on file.           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          Family History:   No family history on file.  From outside [facility] Family History  Arrhythmia... Father       Sleep apnea Father       Brain Tumor Sister         Family History  Relation Name Status Comments   Father   Alive     Mother    Alive     Sister   Alive            Current Medications (antimicrobials listed in bold):     Current Outpatient Medications:     penicillin V (VEETID) 500 MG tablet, Take 1 tablet (500 mg) by mouth 4 times daily., Disp: 120 tablet, Rfl: 5           Allergies:   No Known Allergies         Physical Exam:   Vitals were reviewed    Ranges for his vital signs:  Pulse:  [89] 89  BP: (126)/(86) 126/86  SpO2:  [98 %] 98 %    Physical Examination:  GENERAL:  well-developed, well-nourished, in bed in no acute distress.   HEENT:  Head is normocephalic, atraumatic   EYES:  Eyes have anicteric sclerae without conjunctival injection or stigmata of endocarditis.    ENT:  Oropharynx is moist without exudates or ulcers. Tongue is midline  NECK:  Supple. No  Cervical lymphadenopathy  LUNGS:  Clear to auscultation bilateral.   CARDIOVASCULAR:  Regular rate and rhythm with no murmurs, gallops or rubs.  ABDOMEN:  Normal bowel sounds, soft, nontender. No appreciable hepatosplenomegaly  SKIN:  No acute rashes.    NEUROLOGIC:  Grossly nonfocal. Active x4 extremities  EXTREMITIES: No edema.          Laboratory Data:     Inflammatory Markers  No lab results found.    Hematology Studies    Recent Labs   Lab Test 09/04/24  0942   WBC 5.2   HGB 14.5   MCV 93          Metabolic Studies     Recent Labs   Lab Test 09/04/24  0942      POTASSIUM 4.1   CHLORIDE 108*   CO2 25   BUN 16.8   CR 1.00   GFRESTIMATED >90       Hepatic Studies    Recent Labs   Lab Test 09/04/24  0942   BILITOTAL 0.3   ALKPHOS 115   ALBUMIN 4.4   AST 24   ALT 34     Surgical Pathology Exam: [id number]  Order: [id number]  Collected 5/7/2024 11:47 AM       Status: Final result       Visible to patient: Yes (seen)       Dx: Lesion of tonsil    0 Result Notes       Component  Resulting Agency   Case Report   Surgical Pathology Report                         Case: [id number]                                   Authorizing Provider:  Joe Bains MD   Collected:            05/07/2024 11:47 AM           Ordering Location:     [facility] Ear Nose Received:            05/07/2024 12:07 PM                                  and Throat Clinic                                                                                    [place]                                                                   Pathologist:           Chana Talavera MD                                                                           Specimen:    Tonsil, Left, Tonsil biopsy                                                                Final Diagnosis   A. TONSIL, LEFT, BIOPSY:  - Fragments of tonsil with acute and chronic inflammation  - Colonies of filamentous organisms morphologically consistent with Actinomyces species  - No evidence of high grade dysplasia/carcinoma   Electronically signed by Chana Talavera MD on 5/8/2024 at  9:18 AM

## 2024-09-04 NOTE — TELEPHONE ENCOUNTER
[facility] Call Center    Phone Message    May a detailed message be left on voicemail: yes     Reason for Call: Medication Question or concern regarding medication   Prescription Clarification  Name of Medication: penicillin V (VEETID) 500 MG tablet  Prescribing Provider: Dr Cortes   [facility]    What on the order needs clarification? Pharmacy is calling to clarify dose and quantity on prescription.     Action Taken: Message routed to:  [facility] ID    Travel Screening: Not Applicable     Date of Service: 9/4

## 2024-10-17 ENCOUNTER — MYC MEDICAL ADVICE (OUTPATIENT)
Dept: INFECTIOUS DISEASES | Facility: CLINIC | Age: 29
End: 2024-10-17
Payer: COMMERCIAL

## 2024-10-17 DIAGNOSIS — L98.9 SKIN ABNORMALITY: Primary | ICD-10-CM

## 2024-10-18 NOTE — TELEPHONE ENCOUNTER
"Received MyChart from patient reporting increase of swelling in his neck. Called patient to discuss symptoms. Patient says when he takes his penicillin it lowers his inflammation, but states \"I don't think it's fixing my problem\". Patient clarifies he's isn't concerned about his neck swelling impeding swallowing or breathing and denies fever. He states the shape of his neck \"looks a little bit different\" and states \"there's still gravelly shit in there and I don't know what that is\" and states his actinomycosis has \"not been changing in a positive way\". He reports seeing less swelling and a reduction of pressure in his head, but that he doesn't think the penicillin is treating his infection.     Encouraged patient to send in a photo via [employer], patient states he will try and do this. Also advised patient to send a message to ENT so they are aware. Patient reiterates he is not concerned at this time about any impact on his airway or ability to swallow.     Discussed that Dr. Cortes doesn't have availability in her schedule until December, so we will wait to schedule any sooner follow up (currently scheduled to follow up on 01/14/25) until hearing back from her.     Wished patient a happy birthday as today is his birthday. Will route to provider.       "

## 2024-10-24 ENCOUNTER — OFFICE VISIT (OUTPATIENT)
Dept: DERMATOLOGY | Facility: CLINIC | Age: 29
End: 2024-10-24
Payer: COMMERCIAL

## 2024-10-24 DIAGNOSIS — L98.9 SKIN ABNORMALITY: ICD-10-CM

## 2024-10-24 DIAGNOSIS — D48.9 NEOPLASM OF UNCERTAIN BEHAVIOR: ICD-10-CM

## 2024-10-24 DIAGNOSIS — L72.9 CYST OF SKIN: Primary | ICD-10-CM

## 2024-10-24 PROCEDURE — 88305 TISSUE EXAM BY PATHOLOGIST: CPT | Mod: TC | Performed by: DERMATOLOGY

## 2024-10-24 ASSESSMENT — PAIN SCALES - GENERAL: PAINLEVEL_OUTOF10: NO PAIN (0)

## 2024-10-24 NOTE — NURSING NOTE
Dermatology Rooming Note    Quincy Tong's goals for this visit include:   Chief Complaint   Patient presents with    Derm Problem     Skin abnormality. Left shoulder had a boil that popped.      Zhang Polo, EMT  Clinic Support  [facility]     [phone]    Employed by [facility] [employer]

## 2024-10-24 NOTE — PROGRESS NOTES
[facility] Dermatology Note  Encounter Date: Oct 24, 2024  Office Visit     Dermatology Problem List:  1. EIC  - L retroauricular sulcus s/p punch removal 10/24/24  - L upper back, observation    ____________________________________________    Assessment & Plan:    # Neoplasm of uncertain behavior  Ddx includes EIC vs less likely pilomatricoma. Removal today given bothersome to patient.  - Punch removal today. See procedure note below.    # Cyst, L upper back.   Discussed benign etiology and diagnosis. Offered punch removal today to both lesions. Patient is interested in removal of the L ear cyst only given that this bothers him more than the cyst on his back.   - Continue to monitor  - Reassured as to benign appearance on our exam      Procedures Performed:   - Punch biopsy procedure note, location(s): L retroauricular sulcus. After discussion of benefits and risks including but not limited to bleeding, infection, scar, incomplete removal, recurrence, and non-diagnostic biopsy, verbak consent and photographs were obtained. The area was cleaned with isopropyl alcohol. 0.5mL of 1% lidocaine with epinephrine was injected to obtain adequate anesthesia and a 4 mm punch biopsy was performed at site(s). 4-0 Vicryl sutures were utilized to approximate the epidermal edges. White petrolatum ointment and a bandage was applied to the wound. Explicit verbal and written wound care instructions were provided. The patient left the dermatology clinic in good condition.   - Procedure(s) performed by faculty.     Follow-up: prn for new or changing lesions    Staff and Medical Student:     Nannette Toro, MS4  Seen and staffed with Dr. Zheng    I was present with the medical student who participated in the service and in the documentation.  I have verified the history and personally performed the physical exam and medical decision making.  I agree with the assessment and plan of care as documented in the note.  I  "personally performed all procedures.    Yousuf Zheng MD  Dermatology Attending    ____________________________________________    CC: No chief complaint on file.    HPI:  Mr. Quincy Tong is a(n) 29 year old male who presents today as a new patient for evaluation of a spot on his left back.     The spot has been there for a few months and he feels like he \"had a boil there that popped\". He picked it off in the shower because it was itchy and it bled. He thinks it also expressed some fluid. The spot has since healed and he is wondering what it is. No known treatment.     Also points to a new spot behind his ear that has been present for a week as well. He reports he gets several of these in similar areas frequently.    Patient is otherwise feeling well, without additional skin concerns.    Labs:  None    Physical Exam:  Vitals: There were no vitals taken for this visit.  SKIN: Focused examination of the back and L ear was performed.  - L upper back: 3mm rough yellow papule with surrounding erythema  - L retroauricular sulcus: 2mm mobile skin-colored papule  - No other lesions of concern on areas examined.     Medications:  Current Outpatient Medications   Medication Sig Dispense Refill    penicillin V (VEETID) 500 MG tablet Take 1 tablet (500 mg) by mouth 4 times daily. 120 tablet 5     No current facility-administered medications for this visit.      Past Medical History:   There is no problem list on file for this patient.    No past medical history on file.     CC Referred Self, MD  No address on file on close of this encounter.    "

## 2024-10-24 NOTE — NURSING NOTE
Lidocaine-epinephrine 1-1:622422 % injection   1 mL once for one use, starting 10/24/2024 ending 10/24/2024,  2mL disp, R-0, injection  Injected by Dr. Yousuf Polo, EMT  Clinic Support  [facility]     [phone]    Employed by [facility] [employer]

## 2024-10-24 NOTE — LETTER
10/24/2024       RE: Quincy Tong  [address] [facility] [address]     Dear Colleague,    Thank you for referring your patient, Quincy Tong, to the [facility] DERMATOLOGY [facility] [place] [facility]. Please see a copy of my visit note below.    [facility] Dermatology Note  Encounter Date: Oct 24, 2024  Office Visit     Dermatology Problem List:  1. EIC  - L retroauricular sulcus s/p punch removal 10/24/24  - L upper back, observation    ____________________________________________    Assessment & Plan:    # Neoplasm of uncertain behavior  Ddx includes EIC vs less likely pilomatricoma. Removal today given bothersome to patient.  - Punch removal today. See procedure note below.    # Cyst, L upper back.   Discussed benign etiology and diagnosis. Offered punch removal today to both lesions. Patient is interested in removal of the L ear cyst only given that this bothers him more than the cyst on his back.   - Continue to monitor  - Reassured as to benign appearance on our exam      Procedures Performed:   - Punch biopsy procedure note, location(s): L retroauricular sulcus. After discussion of benefits and risks including but not limited to bleeding, infection, scar, incomplete removal, recurrence, and non-diagnostic biopsy, verbak consent and photographs were obtained. The area was cleaned with isopropyl alcohol. 0.5mL of 1% lidocaine with epinephrine was injected to obtain adequate anesthesia and a 4 mm punch biopsy was performed at site(s). 4-0 Vicryl sutures were utilized to approximate the epidermal edges. White petrolatum ointment and a bandage was applied to the wound. Explicit verbal and written wound care instructions were provided. The patient left the dermatology clinic in good condition.   - Procedure(s) performed by faculty.     Follow-up: prn for new or changing lesions    Staff and Medical Student:     Nannette Toro,  "MS4  Seen and staffed with Dr. Zheng    I was present with the medical student who participated in the service and in the documentation.  I have verified the history and personally performed the physical exam and medical decision making.  I agree with the assessment and plan of care as documented in the note.  I personally performed all procedures.    Yousuf Zheng MD  Dermatology Attending    ____________________________________________    CC: No chief complaint on file.    HPI:  Mr. Quincy Tong is a(n) 29 year old male who presents today as a new patient for evaluation of a spot on his left back.     The spot has been there for a few months and he feels like he \"had a boil there that popped\". He picked it off in the shower because it was itchy and it bled. He thinks it also expressed some fluid. The spot has since healed and he is wondering what it is. No known treatment.     Also points to a new spot behind his ear that has been present for a week as well. He reports he gets several of these in similar areas frequently.    Patient is otherwise feeling well, without additional skin concerns.    Labs:  None    Physical Exam:  Vitals: There were no vitals taken for this visit.  SKIN: Focused examination of the back and L ear was performed.  - L upper back: 3mm rough yellow papule with surrounding erythema  - L retroauricular sulcus: 2mm mobile skin-colored papule  - No other lesions of concern on areas examined.     Medications:  Current Outpatient Medications   Medication Sig Dispense Refill     penicillin V (VEETID) 500 MG tablet Take 1 tablet (500 mg) by mouth 4 times daily. 120 tablet 5     No current facility-administered medications for this visit.      Past Medical History:   There is no problem list on file for this patient.    No past medical history on file.     CC Referred Self, MD  No address on file on close of this encounter.      Again, thank you for allowing me to participate in the care of " your patient.      Sincerely,    Yousuf Zheng MD

## 2024-10-27 PROBLEM — D48.9 NEOPLASM OF UNCERTAIN BEHAVIOR: Status: ACTIVE | Noted: 2024-10-27

## 2024-10-27 PROBLEM — L72.9 CYST OF SKIN: Status: ACTIVE | Noted: 2024-10-27

## 2024-10-28 LAB
PATH REPORT.COMMENTS IMP SPEC: NORMAL
PATH REPORT.COMMENTS IMP SPEC: NORMAL
PATH REPORT.FINAL DX SPEC: NORMAL
PATH REPORT.GROSS SPEC: NORMAL
PATH REPORT.MICROSCOPIC SPEC OTHER STN: NORMAL
PATH REPORT.RELEVANT HX SPEC: NORMAL

## 2024-12-10 ENCOUNTER — TELEPHONE (OUTPATIENT)
Dept: INFECTIOUS DISEASES | Facility: CLINIC | Age: 29
End: 2024-12-10
Payer: COMMERCIAL

## 2025-03-16 ENCOUNTER — HEALTH MAINTENANCE LETTER (OUTPATIENT)
Age: 30
End: 2025-03-16